# Patient Record
(demographics unavailable — no encounter records)

---

## 2025-06-26 NOTE — CONSULT LETTER
[Dear  ___] : Dear  [unfilled], [Courtesy Letter:] : I had the pleasure of seeing your patient, [unfilled], in my office today. [Please see my note below.] : Please see my note below. [Consult Closing:] : Thank you very much for allowing me to participate in the care of this patient.  If you have any questions, please do not hesitate to contact me. [Sincerely,] : Sincerely, [FreeTextEntry2] : [address] [place] [address] [FreeTextEntry3] : Kevin Guaman MD Chief, Pediatric Otolaryngology [facility] and [facility] Professor of Otolaryngology [facility] [employer] at [facility]

## 2025-06-26 NOTE — HISTORY OF PRESENT ILLNESS
[No Personal or Family History of Easy Bruising, Bleeding, or Issues with General Anesthesia] : No Personal or Family History of easy bruising, bleeding, or issues with general anesthesia [de-identified] : Javy is a 4-year-old with ETD, SDB, speech delay and recurrent strep   Here for another opinion  4 ear infections past 6 months  Last was one month ago  Speech delay- no therapy - recommended to wait for   Audio with Dr. Balbuena office - showed hearing loss   Snores at night with restless sleep  +Snoring with pauses and gasping breaths  PSG scheduled for October - [facility]  10 strep infections in the past year  Last 3 weeks ago  Daytime tiredness  Restless sleeper  Needs to be positioned to sleep properly  No bedwetting  + focusing issues  + open mouth breather  + nasal congestion  Flonase for 3 months with no improvement with SDB but rhinorrhea improved  Neb a few days ago - RAD  cough that began last week   Mother anemia  Environmental allergies

## 2025-06-26 NOTE — PHYSICAL EXAM
[4+] : 4+ [Normal Gait and Station] : normal gait and station [Normal muscle strength, symmetry and tone of facial, head and neck musculature] : normal muscle strength, symmetry and tone of facial, head and neck musculature [Normal] : no cervical lymphadenopathy [Effusion] : effusion [Moderate] : moderate left inferior turbinate hypertrophy [Increased Work of Breathing] : no increased work of breathing with use of accessory muscles and retractions [de-identified] : cough, nasal congestion  [de-identified] : rhinorrhea  [de-identified] : rhinorrhea

## 2025-06-26 NOTE — PROCEDURE
[FreeTextEntry1] : Nasal Endoscopy [FreeTextEntry2] : Chronic Rhinitis [FreeTextEntry3] : After informed verbal consent is obtained, the fiberoptic nasal endoscope is passed via the right nasal cavity. The osteomeatal complex is clear with no polyposis or purulence. The sphenoethmoidal recess is clear with no polyposis or purulence. The choana is patent.  The fiberoptic nasal endoscope is passed via the left nasal cavity. The osteomeatal complex is clear with no polyposis or purulence. The sphenoethmoidal recess is clear with no polyposis or purulence. The choana is patent.  There is 80% obstruction of the nasopharynx with adenoid tissue.